# Patient Record
Sex: MALE | URBAN - METROPOLITAN AREA
[De-identification: names, ages, dates, MRNs, and addresses within clinical notes are randomized per-mention and may not be internally consistent; named-entity substitution may affect disease eponyms.]

---

## 2022-02-22 ENCOUNTER — APPOINTMENT (RX ONLY)
Dept: URBAN - METROPOLITAN AREA CLINIC 328 | Facility: CLINIC | Age: 87
Setting detail: DERMATOLOGY
End: 2022-02-22

## 2022-02-22 PROBLEM — C44.622 SQUAMOUS CELL CARCINOMA OF SKIN OF RIGHT UPPER LIMB, INCLUDING SHOULDER: Status: ACTIVE | Noted: 2022-02-22

## 2022-02-22 PROBLEM — C44.42 SQUAMOUS CELL CARCINOMA OF SKIN OF SCALP AND NECK: Status: ACTIVE | Noted: 2022-02-22

## 2022-02-22 PROCEDURE — ? ADDITIONAL NOTES

## 2022-02-22 PROCEDURE — ? CONSULTATION FOR MOHS SURGERY

## 2022-02-22 PROCEDURE — 99213 OFFICE O/P EST LOW 20 MIN: CPT

## 2022-02-22 NOTE — PROCEDURE: CONSULTATION FOR MOHS SURGERY
Detail Level: Detailed
X Size Of Lesion In Cm (Optional): 0
Anatomic Location From Referring Provider: l post auricular
Incorporate Mauc In Note: Yes

## 2022-02-22 NOTE — HPI: BIOPSY FOLLOW-UP
Pathology Results (Optional): Left posterior auricular- squamous cell carcinoma\\n\\nRight dorsal hand- squamous cell carcinoma

## 2022-03-14 ENCOUNTER — APPOINTMENT (RX ONLY)
Dept: URBAN - METROPOLITAN AREA CLINIC 331 | Facility: CLINIC | Age: 87
Setting detail: DERMATOLOGY
End: 2022-03-14

## 2022-03-14 PROBLEM — C44.42 SQUAMOUS CELL CARCINOMA OF SKIN OF SCALP AND NECK: Status: ACTIVE | Noted: 2022-03-14

## 2022-03-14 PROCEDURE — 17311 MOHS 1 STAGE H/N/HF/G: CPT

## 2022-03-14 PROCEDURE — ? MOHS SURGERY

## 2022-03-14 PROCEDURE — 12032 INTMD RPR S/A/T/EXT 2.6-7.5: CPT

## 2022-03-14 NOTE — PROCEDURE: MOHS SURGERY
1.  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  stable. 2. PCO  OD (Posterior Capsule Opacification) --Mild--defer yag. 3.  Diplopia -   is better-seem to correlate with BP problems. Pt has 5 LISSETTE prism in driving rx. 3.  Pseudophakia OU - IOLs stable. OD capsule haze OS clear capsule. 4.  Dry Eyes OU:  Start artificials tears. Encouraged regular use. 5.  Floaters OD:  Patient was cautioned to call our office immediately if they experience a substantial change in their symptoms such as an increase in floaters persistent flashes loss of visual field 6. Pt wearing BF.--Give copy of rx with prism. Pt DOES NOT wear suns and wont. 7. Hx Diplopia-  sx by DR Kenny [id number].   RTN 1 yr CE/OCT Mac   SP Primary Defect Length In Cm (Final Defect Size - Required For Flaps/Grafts): 1.5

## 2022-03-14 NOTE — HPI: PROCEDURE (MOHS)
Has The Growth Been Previously Biopsied?: has been previously biopsied
Body Location Override (Optional): Left posterior auricular

## 2022-03-15 ENCOUNTER — RX ONLY (OUTPATIENT)
Age: 87
Setting detail: RX ONLY
End: 2022-03-15

## 2022-03-15 RX ORDER — MUPIROCIN 20 MG/G
OINTMENT TOPICAL
Qty: 22 | Refills: 3 | Status: ERX | COMMUNITY
Start: 2022-03-15

## 2022-03-23 ENCOUNTER — APPOINTMENT (RX ONLY)
Dept: URBAN - METROPOLITAN AREA CLINIC 328 | Facility: CLINIC | Age: 87
Setting detail: DERMATOLOGY
End: 2022-03-23

## 2022-03-23 DIAGNOSIS — L57.0 ACTINIC KERATOSIS: ICD-10-CM

## 2022-03-23 DIAGNOSIS — Z48.02 ENCOUNTER FOR REMOVAL OF SUTURES: ICD-10-CM

## 2022-03-23 PROCEDURE — 17000 DESTRUCT PREMALG LESION: CPT | Mod: 79

## 2022-03-23 PROCEDURE — 17003 DESTRUCT PREMALG LES 2-14: CPT | Mod: 79

## 2022-03-23 PROCEDURE — ? LIQUID NITROGEN

## 2022-03-23 PROCEDURE — 99024 POSTOP FOLLOW-UP VISIT: CPT

## 2022-03-23 PROCEDURE — ? POST-OP WOUND CHECK

## 2022-03-23 ASSESSMENT — LOCATION SIMPLE DESCRIPTION DERM
LOCATION SIMPLE: LEFT FOREHEAD
LOCATION SIMPLE: SCALP
LOCATION SIMPLE: RIGHT SCALP
LOCATION SIMPLE: LEFT CHEEK

## 2022-03-23 ASSESSMENT — LOCATION DETAILED DESCRIPTION DERM
LOCATION DETAILED: RIGHT CENTRAL FRONTAL SCALP
LOCATION DETAILED: LEFT LATERAL MALAR CHEEK
LOCATION DETAILED: LEFT SUPERIOR POSTAURICULAR SKIN
LOCATION DETAILED: LEFT LATERAL FOREHEAD

## 2022-03-23 ASSESSMENT — LOCATION ZONE DERM
LOCATION ZONE: SCALP
LOCATION ZONE: FACE

## 2022-03-23 NOTE — HPI: WOUND CHECK (POST-OP)
Date Of Procedure: 3/14/2022
Additional History: \\n\\nPatient would like to have sutures removed today if possible.

## 2022-04-06 ENCOUNTER — APPOINTMENT (RX ONLY)
Dept: URBAN - METROPOLITAN AREA CLINIC 331 | Facility: CLINIC | Age: 87
Setting detail: DERMATOLOGY
End: 2022-04-06

## 2022-04-06 PROBLEM — D04.61 CARCINOMA IN SITU OF SKIN OF RIGHT UPPER LIMB, INCLUDING SHOULDER: Status: ACTIVE | Noted: 2022-04-06

## 2022-04-06 PROCEDURE — ? PRESCRIPTION

## 2022-04-06 PROCEDURE — ? ADDITIONAL NOTES

## 2022-04-06 PROCEDURE — ? MOHS SURGERY

## 2022-04-06 PROCEDURE — 13132 CMPLX RPR F/C/C/M/N/AX/G/H/F: CPT

## 2022-04-06 PROCEDURE — 17311 MOHS 1 STAGE H/N/HF/G: CPT

## 2022-04-06 RX ORDER — MUPIROCIN 20 MG/G
OINTMENT TOPICAL BID
Qty: 22 | Refills: 1 | Status: ERX | COMMUNITY
Start: 2022-04-06

## 2022-04-06 RX ADMIN — MUPIROCIN: 20 OINTMENT TOPICAL at 00:00

## 2022-04-06 NOTE — PROCEDURE: MOHS SURGERY
Declines Pain Refusal Text: I offered to prescribe pain medication but the patient refused to take this medication.

## 2022-04-06 NOTE — PROCEDURE: MOHS SURGERY
Subjective   Courtney Saleh is a 25 y.o. female.     HPI Comments: Patient is here today for follow up on her obesity. She states she has not taken the Adipex for a month, and she can tell. She states her appetite is not as suppressed as it was, so she is eating more, plus, she sits at a desk at work all day, and just snacks all the time. She states it is not healthy food either. She states the Adipex also gave her energy and she was exercising more, and she has not been exercising much the past month either.     Patient is also here for follow up on her mood disorder. She states the Trileptal is working very well for her and she and her family can tell a big difference in her. She states it does not cause her to have any weight gain either, and she is very happy with it.        The following portions of the patient's history were reviewed and updated as appropriate: allergies, current medications, past family history, past medical history, past social history, past surgical history and problem list.    Review of Systems   Constitutional: Negative.    HENT: Negative.    Eyes: Negative.    Respiratory: Negative.    Cardiovascular: Negative.    Gastrointestinal: Negative.    Genitourinary: Negative.    Musculoskeletal: Negative.    Skin: Negative.    Neurological: Negative for dizziness, syncope, weakness and numbness.   Hematological: Negative for adenopathy.   Psychiatric/Behavioral: Negative for confusion and suicidal ideas. The patient is not nervous/anxious.        Objective   Physical Exam   Constitutional: She is oriented to person, place, and time. She appears well-developed and well-nourished. No distress.   HENT:   Head: Normocephalic.   Right Ear: External ear normal.   Left Ear: External ear normal.   Nose: Nose normal.   Eyes: Conjunctivae are normal.   Neck: Normal range of motion. Neck supple. No tracheal deviation present. No thyromegaly present.   Cardiovascular: Normal rate, regular rhythm, normal  heart sounds and intact distal pulses.    No murmur heard.  Pulmonary/Chest: Effort normal and breath sounds normal. No respiratory distress. She has no wheezes. She has no rales. She exhibits no tenderness.   Abdominal: Soft. Bowel sounds are normal. She exhibits no distension and no mass. There is no hepatosplenomegaly or splenomegaly. There is no tenderness. There is no rebound and no guarding. No hernia.   Musculoskeletal: Normal range of motion. She exhibits no edema or tenderness.   NROM all major joints   Lymphadenopathy:     She has no cervical adenopathy.        Right cervical: No superficial cervical, no deep cervical and no posterior cervical adenopathy present.       Left cervical: No superficial cervical, no deep cervical and no posterior cervical adenopathy present.   Neurological: She is alert and oriented to person, place, and time. Coordination and gait normal.   Skin: Skin is warm and dry. No rash noted.   Psychiatric: She has a normal mood and affect. Her behavior is normal. Judgment and thought content normal.       Assessment/Plan   Courtney was seen today for follow-up.    Diagnoses and all orders for this visit:    Obesity, Class III, BMI 40-49.9 (morbid obesity)  -     phentermine 37.5 MG capsule; Take 1 capsule by mouth Every Morning for 30 days.    Mood disorder  -     OXcarbazepine (TRILEPTAL) 300 MG tablet; Take 1 tablet by mouth 2 (Two) Times a Day for 30 days.    Bipolar disorder, current episode mixed, mild  -     OXcarbazepine (TRILEPTAL) 300 MG tablet; Take 1 tablet by mouth 2 (Two) Times a Day for 30 days.      Adipex restarted today, for treatment of her obesity. Nutrition and activity goals reviewed including: mainly water to drink, limit white flour/processed sugar, high protein, high fiber carbs, good breakfast, working toward 150 mins cardio per week, weight training 2x/week.    Trileptal continued for her mood and bipolar disorder, since it is working well.     Patient was  encouraged to keep me informed of any acute changes, lack of improvement, or any new concerning symptoms.    Patient to RTC in 4 weeks for follow up or sooner if needed.           Oculoplastic Surgeon Procedure Text (A): After obtaining clear surgical margins the patient was sent to oculoplastics for surgical repair.  The patient understands they will receive post-surgical care and follow-up from the referring physician's office.

## 2022-04-06 NOTE — PROCEDURE: MOHS SURGERY

## 2022-04-20 ENCOUNTER — APPOINTMENT (RX ONLY)
Dept: URBAN - METROPOLITAN AREA CLINIC 334 | Facility: CLINIC | Age: 87
Setting detail: DERMATOLOGY
End: 2022-04-20

## 2022-04-20 DIAGNOSIS — Z48.02 ENCOUNTER FOR REMOVAL OF SUTURES: ICD-10-CM

## 2022-04-20 PROCEDURE — ? ADDITIONAL NOTES

## 2023-09-13 ENCOUNTER — APPOINTMENT (RX ONLY)
Dept: URBAN - METROPOLITAN AREA CLINIC 328 | Facility: CLINIC | Age: 88
Setting detail: DERMATOLOGY
End: 2023-09-13

## 2023-09-13 DIAGNOSIS — D18.0 HEMANGIOMA: ICD-10-CM

## 2023-09-13 DIAGNOSIS — D22 MELANOCYTIC NEVI: ICD-10-CM

## 2023-09-13 DIAGNOSIS — L82.1 OTHER SEBORRHEIC KERATOSIS: ICD-10-CM

## 2023-09-13 DIAGNOSIS — Z85.828 PERSONAL HISTORY OF OTHER MALIGNANT NEOPLASM OF SKIN: ICD-10-CM

## 2023-09-13 DIAGNOSIS — L57.0 ACTINIC KERATOSIS: ICD-10-CM

## 2023-09-13 DIAGNOSIS — L81.4 OTHER MELANIN HYPERPIGMENTATION: ICD-10-CM

## 2023-09-13 DIAGNOSIS — L82.0 INFLAMED SEBORRHEIC KERATOSIS: ICD-10-CM

## 2023-09-13 PROBLEM — D18.01 HEMANGIOMA OF SKIN AND SUBCUTANEOUS TISSUE: Status: ACTIVE | Noted: 2023-09-13

## 2023-09-13 PROBLEM — D22.5 MELANOCYTIC NEVI OF TRUNK: Status: ACTIVE | Noted: 2023-09-13

## 2023-09-13 PROCEDURE — ? TREATMENT REGIMEN

## 2023-09-13 PROCEDURE — ? LIQUID NITROGEN

## 2023-09-13 PROCEDURE — 99213 OFFICE O/P EST LOW 20 MIN: CPT | Mod: 25

## 2023-09-13 PROCEDURE — 17003 DESTRUCT PREMALG LES 2-14: CPT | Mod: 59

## 2023-09-13 PROCEDURE — 17000 DESTRUCT PREMALG LESION: CPT | Mod: 59

## 2023-09-13 PROCEDURE — 17110 DESTRUCTION B9 LES UP TO 14: CPT

## 2023-09-13 PROCEDURE — ? COUNSELING

## 2023-09-13 PROCEDURE — ? FULL BODY SKIN EXAM

## 2023-09-13 ASSESSMENT — LOCATION SIMPLE DESCRIPTION DERM
LOCATION SIMPLE: LEFT SCALP
LOCATION SIMPLE: SCALP
LOCATION SIMPLE: LEFT UPPER BACK
LOCATION SIMPLE: POSTERIOR SCALP
LOCATION SIMPLE: RIGHT LOWER BACK
LOCATION SIMPLE: LEFT FOREHEAD
LOCATION SIMPLE: RIGHT SCALP
LOCATION SIMPLE: ABDOMEN
LOCATION SIMPLE: LEFT EAR
LOCATION SIMPLE: CHEST
LOCATION SIMPLE: LEFT SHOULDER

## 2023-09-13 ASSESSMENT — LOCATION DETAILED DESCRIPTION DERM
LOCATION DETAILED: LEFT MEDIAL FRONTAL SCALP
LOCATION DETAILED: POSTERIOR MID-PARIETAL SCALP
LOCATION DETAILED: RIGHT SUPERIOR MEDIAL MIDBACK
LOCATION DETAILED: EPIGASTRIC SKIN
LOCATION DETAILED: LEFT SUPERIOR MEDIAL FOREHEAD
LOCATION DETAILED: LEFT SUPERIOR POSTERIOR HELIX
LOCATION DETAILED: LEFT MID-UPPER BACK
LOCATION DETAILED: RIGHT MEDIAL SUPERIOR CHEST
LOCATION DETAILED: LEFT SUPERIOR PARIETAL SCALP
LOCATION DETAILED: LEFT ANTERIOR SHOULDER
LOCATION DETAILED: RIGHT SUPERIOR PARIETAL SCALP
LOCATION DETAILED: RIGHT MEDIAL FRONTAL SCALP
LOCATION DETAILED: RIGHT CENTRAL FRONTAL SCALP
LOCATION DETAILED: LEFT SUPERIOR HELIX

## 2023-09-13 ASSESSMENT — LOCATION ZONE DERM
LOCATION ZONE: TRUNK
LOCATION ZONE: EAR
LOCATION ZONE: SCALP
LOCATION ZONE: FACE
LOCATION ZONE: ARM

## 2023-09-13 NOTE — PROCEDURE: LIQUID NITROGEN
Spray Paint Text: The liquid nitrogen was applied to the skin utilizing a spray paint frosting technique.
Show Applicator Variable?: Yes
Medical Necessity Information: It is in your best interest to select a reason for this procedure from the list below. All of these items fulfill various CMS LCD requirements except the new and changing color options.
Post-Care Instructions: I reviewed with the patient in detail post-care instructions. Patient is to wear sunprotection, and avoid picking at any of the treated lesions. Pt may apply Vaseline to crusted or scabbing areas.
Include Z78.9 (Other Specified Conditions Influencing Health Status) As An Associated Diagnosis?: No
Consent: The patient's consent was obtained including but not limited to risks of crusting, scabbing, blistering, scarring, darker or lighter pigmentary change, recurrence, incomplete removal and infection.
Medical Necessity Clause: This procedure was medically necessary because the lesions that were treated were:
Detail Level: Detailed
Duration Of Freeze Thaw-Cycle (Seconds): 0

## 2024-07-16 ENCOUNTER — APPOINTMENT (RX ONLY)
Dept: URBAN - METROPOLITAN AREA CLINIC 328 | Facility: CLINIC | Age: 89
Setting detail: DERMATOLOGY
End: 2024-07-16

## 2024-07-16 DIAGNOSIS — L57.0 ACTINIC KERATOSIS: ICD-10-CM | Status: INADEQUATELY CONTROLLED

## 2024-07-16 PROCEDURE — 17000 DESTRUCT PREMALG LESION: CPT

## 2024-07-16 PROCEDURE — 17003 DESTRUCT PREMALG LES 2-14: CPT

## 2024-07-16 PROCEDURE — ? LIQUID NITROGEN

## 2024-07-16 PROCEDURE — ? COUNSELING

## 2024-07-16 ASSESSMENT — LOCATION DETAILED DESCRIPTION DERM
LOCATION DETAILED: LEFT CENTRAL FRONTAL SCALP
LOCATION DETAILED: LEFT SUPERIOR PARIETAL SCALP
LOCATION DETAILED: RIGHT SUPERIOR PARIETAL SCALP
LOCATION DETAILED: LEFT CENTRAL MALAR CHEEK
LOCATION DETAILED: RIGHT MEDIAL FRONTAL SCALP
LOCATION DETAILED: LEFT MEDIAL FRONTAL SCALP
LOCATION DETAILED: LEFT SUPERIOR HELIX

## 2024-07-16 ASSESSMENT — LOCATION SIMPLE DESCRIPTION DERM
LOCATION SIMPLE: RIGHT SCALP
LOCATION SIMPLE: LEFT CHEEK
LOCATION SIMPLE: LEFT EAR
LOCATION SIMPLE: LEFT SCALP
LOCATION SIMPLE: SCALP

## 2024-07-16 ASSESSMENT — LOCATION ZONE DERM
LOCATION ZONE: FACE
LOCATION ZONE: SCALP
LOCATION ZONE: EAR

## 2024-07-16 NOTE — PROCEDURE: LIQUID NITROGEN
Number Of Freeze-Thaw Cycles: 1 freeze-thaw cycle
Post-Care Instructions: I reviewed with the patient in detail post-care instructions. Patient is to wear sunprotection, and avoid picking at any of the treated lesions. Pt may apply Vaseline to crusted or scabbing areas.
Render Note In Bullet Format When Appropriate: No
Detail Level: Detailed
Show Applicator Variable?: Yes
Consent: The patient's consent was obtained including but not limited to risks of crusting, scabbing, blistering, scarring, darker or lighter pigmentary change, recurrence, incomplete removal and infection.
Duration Of Freeze Thaw-Cycle (Seconds): 1

## 2024-07-16 NOTE — PROCEDURE: POST-OP WOUND CHECK
independent
Detail Level: Detailed
Add 41151 Cpt? (Important Note: In 2017 The Use Of 29917 Is Being Tracked By Cms To Determine Future Global Period Reimbursement For Global Periods): yes